# Patient Record
Sex: FEMALE | Race: BLACK OR AFRICAN AMERICAN | Employment: UNEMPLOYED | URBAN - METROPOLITAN AREA
[De-identification: names, ages, dates, MRNs, and addresses within clinical notes are randomized per-mention and may not be internally consistent; named-entity substitution may affect disease eponyms.]

---

## 2020-07-05 ENCOUNTER — APPOINTMENT (OUTPATIENT)
Dept: GENERAL RADIOLOGY | Facility: CLINIC | Age: 45
End: 2020-07-05
Attending: EMERGENCY MEDICINE
Payer: COMMERCIAL

## 2020-07-05 ENCOUNTER — HOSPITAL ENCOUNTER (EMERGENCY)
Facility: CLINIC | Age: 45
Discharge: HOME | End: 2020-07-05
Attending: EMERGENCY MEDICINE | Admitting: EMERGENCY MEDICINE
Payer: COMMERCIAL

## 2020-07-05 VITALS
TEMPERATURE: 97.9 F | DIASTOLIC BLOOD PRESSURE: 81 MMHG | SYSTOLIC BLOOD PRESSURE: 129 MMHG | OXYGEN SATURATION: 99 % | HEART RATE: 63 BPM | RESPIRATION RATE: 16 BRPM

## 2020-07-05 DIAGNOSIS — M79.672 LEFT FOOT PAIN: ICD-10-CM

## 2020-07-05 DIAGNOSIS — M25.562 ACUTE PAIN OF LEFT KNEE: ICD-10-CM

## 2020-07-05 PROCEDURE — 73562 X-RAY EXAM OF KNEE 3: CPT | Mod: LT

## 2020-07-05 PROCEDURE — 99284 EMERGENCY DEPT VISIT MOD MDM: CPT

## 2020-07-05 PROCEDURE — 73630 X-RAY EXAM OF FOOT: CPT | Mod: LT

## 2020-07-05 NOTE — ED AVS SNAPSHOT
[facility] Emergency Department  [address]  [facility] [address]  Phone:  [phone]  Fax:  [phone]                                    Debbie Smart   MRN: [id number]    Department:  [facility] Emergency Department   Date of Visit:  7/5/2020           After Visit Summary Signature Page    I have received my discharge instructions, and my questions have been answered. I have discussed any challenges I see with this plan with the nurse or doctor.    ..........................................................................................................................................  Patient/Patient Representative Signature      ..........................................................................................................................................  Patient Representative Print Name and Relationship to Patient    ..................................................               ................................................  Date                                   Time    ..........................................................................................................................................  Reviewed by Signature/Title    ...................................................              ..............................................  Date                                               Time          22EPIC Rev 08/18

## 2020-07-05 NOTE — ED TRIAGE NOTES
Pt arrives via EMS, was walking in [facility] and slipped on some water on the floor c/o left knee pain. Pt alert, oriented x3 ABCs intact

## 2020-07-05 NOTE — ED PROVIDER NOTES
History     Chief Complaint:  Knee Injury       HPI   Debbie Smart is a 44 year old female who presents with knee injury. The patient states that she slipped on water at [employer] and fell forward onto her left knee. She did not hit her chest or head and did not lose consciousness. She complains of left knee pain and pain in her left foot.     Allergies:  No Known Drug Allergies    Medications:    Medications reviewed. No current medications.     Past Medical History:    Medical history reviewed. No pertinent medical history.    Past Surgical History:    Surgical history reviewed. No pertinent surgical history.    Family History:    Family history reviewed. No pertinent family history.       Social History:  Patient presents alone.     Review of Systems   Musculoskeletal:        Left knee pain   Left foot pain   All other systems reviewed and are negative.    Physical Exam     Patient Vitals for the past 24 hrs:   BP Temp Temp src Pulse Heart Rate Resp SpO2   07/05/20 1821 129/81 97.9  F (36.6  C) Oral 63 63 16 99 %        Physical Exam  General: Patient is alert, awake and interactive  Head: The scalp, face, and head appear normal  Eyes: Conjunctivae are normal  ENT: The nose is normal, Pinnae are normal, External acoustic canals are normal  Neck: Trachea midline  CV: Pulses are normal.   Resp: No respiratory distress   Musc: Normal muscular tone, moving all extremities.  Left knee exam:   Inspection: no obvious deformity   Palpation: TTP over the patella   ROM: Able to actively flex/ext knee.  Sensation: Intact to light touch distally  Cap refil:   < 2 seconds  DP/PT Pulses normal.  Left hip: full flex/ext w/o pain, no ttp.  Left ankle/foot: Able to flex/ext toes and DF/PF ankle actively. No TTP.  Special Tests:   Posterior drawer: negative  Varus stress test: negative   Valgus stress test: negative  Skin: No rash or lesions noted  Neuro: Speech is normal and fluent. Face is symmetric.   Psych: Normal affect.   Appropriate interactions.    Emergency Department Course     Imaging:  Radiology findings were communicated with the patient who voiced understanding of the findings.    XR Knee Left 3 Views  Final Result  IMPRESSION:   1.  Left knee negative for fracture or joint malalignment. No joint effusion. Minimal degenerative changes in the medial and patellofemoral compartments.  Reading per radiology     Foot XR, G/E 3 views, left  Final Result  IMPRESSION: Unremarkable left foot. Normal joint spaces and alignment. No fracture. Specifically, no abnormality identified in the medial forefoot near the first and second MTP joints.  Reading per radiology     Emergency Department Course:  Past medical records, nursing notes, and vitals reviewed.    1825 I performed an exam of the patient as documented above.    The patient was sent for imaging while in the emergency department, results above.       1940 Patient rechecked and updated.       Findings and plan explained to the Patient. Patient discharged home with instructions regarding supportive care, medications, and reasons to return. The importance of close follow-up was reviewed.     Impression & Plan     Medical Decision Making:  Debbie Smart is a 44 year old female who presents to the emergency department today with left knee and foot pain after a fall.  Physical exam as detailed above.  The remainder of her head to toe trauma exam is reassuring.  X-rays of the knee and the foot were negative for any acute fracture or dislocation.  I have low suspicion for ligamentous or meniscal injury in the knee however for pain continues she should follow-up with orthopedic surgery.  Knee was wrapped and patient was given crutches for comfort.  I discussed my findings with the patient and plan.  She is amenable at this time.  All questions were answered and patient be discharged home in stable condition    Discharge Diagnosis:    ICD-10-CM    1. Acute pain of left knee  M25.562    2.  Left foot pain  M79.672        Disposition:  The patient is discharged to home.    Scribe Disclosure:  I, Rickie Caprice, am serving as a scribe at 6:25 PM on 7/5/2020 to document services personally performed by Jose R Villanueva MD based on my observations and the provider's statements to me.      7/5/2020   Jose R Villanueva MD Battista, Christopher Joseph, MD  07/06/20 0028